# Patient Record
Sex: FEMALE | Race: WHITE | NOT HISPANIC OR LATINO | URBAN - NONMETROPOLITAN AREA
[De-identification: names, ages, dates, MRNs, and addresses within clinical notes are randomized per-mention and may not be internally consistent; named-entity substitution may affect disease eponyms.]

---

## 2017-09-15 PROBLEM — H26.493: Noted: 2019-10-11

## 2017-09-15 PROBLEM — H43.813: Noted: 2017-09-15

## 2017-09-15 PROBLEM — E11.9: Noted: 2019-10-11

## 2017-09-15 PROBLEM — H52.4: Noted: 2018-10-08

## 2017-09-15 PROBLEM — Z96.1: Noted: 2019-10-11

## 2019-10-11 ENCOUNTER — IMPORTED ENCOUNTER (OUTPATIENT)
Dept: URBAN - NONMETROPOLITAN AREA CLINIC 1 | Facility: CLINIC | Age: 74
End: 2019-10-11

## 2019-10-11 PROBLEM — Z96.1: Noted: 2019-10-11

## 2019-10-11 PROBLEM — E11.9: Noted: 2019-10-11

## 2019-10-11 PROBLEM — H43.813: Noted: 2017-09-15

## 2019-10-11 PROBLEM — H52.4: Noted: 2018-10-08

## 2019-10-11 PROCEDURE — 92014 COMPRE OPH EXAM EST PT 1/>: CPT

## 2019-10-11 NOTE — PATIENT DISCUSSION
NIDDM sans Retinopathy x 2012 Discussed diagnosis with patient. Discussed the risk of diabetic damage of the retina with potential vision loss and the importance of routine follow-up. Emphasized strict blood sugar control. Last A1C was 8Letter to [place] @ [facility] in [place] NPDR seen on todays exam Continue to monitor. Pseudophakia OU w/ PCODiscussed diagnosis in detail with patient. Both intraocular lenses are stable and in palce. PCO noted OU not visually signifcant @ this timeContinue to monitor. PVD OUDiscussed findings of exam in detail with the patient. The risk of retinal detachment in patients with PVDs was discussed with the patient and the warning signs of retinal detachment were carefully reviewed with the patient. The patient was warned to return to the office or contact the ophthalmologist on call immediately if they experience signs of retinal detachment. Continue to monitor. Presbyopia OUDiscussed refractive status in detail with patientContinue using OTC reading glasses.  Continue to monitor.; 's Notes: MR 10/11/19- ZO FARIA 10/8/18

## 2020-10-16 ENCOUNTER — IMPORTED ENCOUNTER (OUTPATIENT)
Dept: URBAN - NONMETROPOLITAN AREA CLINIC 1 | Facility: CLINIC | Age: 75
End: 2020-10-16

## 2020-10-16 PROCEDURE — 92014 COMPRE OPH EXAM EST PT 1/>: CPT

## 2020-10-16 NOTE — PATIENT DISCUSSION
NIDDM sans Retinopathy x 2012 Discussed diagnosis with patient. Discussed the risk of diabetic damage of the retina with potential vision loss and the importance of routine follow-up. Emphasized strict blood sugar control. Last A1C was 7.2Letter to [facility] in [place] NPDR seen on todays exam Continue to monitor. Pseudophakia OU w/ PCODiscussed diagnosis in detail with patient. Both intraocular lenses are stable and in palce. PCO noted OU not visually signifcant at this timeContinue to monitor. PVD OUDiscussed findings of exam in detail with the patient. The risk of retinal detachment in patients with PVDs was discussed with the patient and the warning signs of retinal detachment were carefully reviewed with the patient. The patient was warned to return to the office or contact the ophthalmologist on call immediately if they experience signs of retinal detachment. Continue to monitor. Presbyopia OUDiscussed refractive status in detail with patientNew glasses Rx given today but ok to continue using OTC reading glasses.  Continue to monitor.; 's Notes: MR PATHAK DFWILI 10/16/20OPTOS

## 2021-10-18 ENCOUNTER — IMPORTED ENCOUNTER (OUTPATIENT)
Dept: URBAN - NONMETROPOLITAN AREA CLINIC 1 | Facility: CLINIC | Age: 76
End: 2021-10-18

## 2021-10-18 PROCEDURE — 92250 FUNDUS PHOTOGRAPHY W/I&R: CPT

## 2021-10-18 PROCEDURE — 99214 OFFICE O/P EST MOD 30 MIN: CPT

## 2021-10-18 NOTE — PATIENT DISCUSSION
NIDDM sans Retinopathy x 2012 Discussed diagnosis with patient. Discussed the risk of diabetic damage of the retina with potential vision loss and the importance of routine follow-up. Emphasized strict blood sugar control. Last A1C was 8.2Letter to [facility] in [place] done today; baseline with no BDR OU. Continue to monitor. Pseudophakia OU w/ PCODiscussed diagnosis in detail with patient. Both intraocular lenses are stable and in palce. PCO noted OU not visually signifcant at this timeContinue to monitor. PVD OUDiscussed findings of exam in detail with the patient. The risk of retinal detachment in patients with PVDs was discussed with the patient and the warning signs of retinal detachment were carefully reviewed with the patient. The patient was warned to return to the office or contact the ophthalmologist on call immediately if they experience signs of retinal detachment. Continue to monitor. Presbyopia OUDiscussed refractive status in detail with patientContinue using OTC reading glasses.  Continue to monitor.; 's Notes: MR PATHAK DFWILI 10/18/21OPTOS   10/18/21

## 2022-04-09 ASSESSMENT — TONOMETRY
OS_IOP_MMHG: 14
OD_IOP_MMHG: 14
OD_IOP_MMHG: 14
OD_IOP_MMHG: 12
OS_IOP_MMHG: 12
OS_IOP_MMHG: 14

## 2022-04-09 ASSESSMENT — VISUAL ACUITY
OS_CC: 20/20
OD_GLARE: 20/25
OD_CC: 20/20-
OS_SC: 20/20-2
OD_SC: 20/29-1
OS_GLARE: 20/22
OS_CC: 20/20
OD_CC: 20/22

## 2022-10-24 ENCOUNTER — COMPREHENSIVE EXAM (OUTPATIENT)
Dept: URBAN - NONMETROPOLITAN AREA CLINIC 1 | Facility: CLINIC | Age: 77
End: 2022-10-24

## 2022-10-24 DIAGNOSIS — H43.813: ICD-10-CM

## 2022-10-24 PROCEDURE — 99214 OFFICE O/P EST MOD 30 MIN: CPT

## 2022-10-24 PROCEDURE — 92250 FUNDUS PHOTOGRAPHY W/I&R: CPT

## 2022-10-24 ASSESSMENT — TONOMETRY
OS_IOP_MMHG: 13
OD_IOP_MMHG: 13

## 2022-10-24 ASSESSMENT — VISUAL ACUITY
OS_SC: 20/25
OD_SC: 20/30

## 2023-11-09 ENCOUNTER — ESTABLISHED PATIENT (OUTPATIENT)
Dept: URBAN - NONMETROPOLITAN AREA CLINIC 1 | Facility: CLINIC | Age: 78
End: 2023-11-09

## 2023-11-09 DIAGNOSIS — E11.9: ICD-10-CM

## 2023-11-09 DIAGNOSIS — H43.813: ICD-10-CM

## 2023-11-09 DIAGNOSIS — H26.493: ICD-10-CM

## 2023-11-09 PROCEDURE — 92250 FUNDUS PHOTOGRAPHY W/I&R: CPT

## 2023-11-09 PROCEDURE — 99214 OFFICE O/P EST MOD 30 MIN: CPT

## 2023-11-09 ASSESSMENT — VISUAL ACUITY
OU_SC: 20/20
OS_SC: 20/20
OD_SC: 20/22-

## 2023-11-09 ASSESSMENT — TONOMETRY
OD_IOP_MMHG: 12
OS_IOP_MMHG: 12

## 2024-11-14 ENCOUNTER — COMPREHENSIVE EXAM (OUTPATIENT)
Dept: URBAN - NONMETROPOLITAN AREA CLINIC 1 | Facility: CLINIC | Age: 79
End: 2024-11-14

## 2024-11-14 DIAGNOSIS — H43.813: ICD-10-CM

## 2024-11-14 DIAGNOSIS — E11.9: ICD-10-CM

## 2024-11-14 PROCEDURE — 99213 OFFICE O/P EST LOW 20 MIN: CPT

## 2024-11-14 PROCEDURE — 92250 FUNDUS PHOTOGRAPHY W/I&R: CPT
